# Patient Record
Sex: MALE | Race: WHITE | Employment: FULL TIME | URBAN - METROPOLITAN AREA
[De-identification: names, ages, dates, MRNs, and addresses within clinical notes are randomized per-mention and may not be internally consistent; named-entity substitution may affect disease eponyms.]

---

## 2022-05-05 ENCOUNTER — APPOINTMENT (OUTPATIENT)
Dept: GENERAL RADIOLOGY | Age: 22
End: 2022-05-05
Payer: COMMERCIAL

## 2022-05-05 ENCOUNTER — HOSPITAL ENCOUNTER (EMERGENCY)
Age: 22
Discharge: HOME | End: 2022-05-05
Attending: EMERGENCY MEDICINE
Payer: COMMERCIAL

## 2022-05-05 VITALS
BODY MASS INDEX: 19.72 KG/M2 | TEMPERATURE: 99.1 F | RESPIRATION RATE: 12 BRPM | HEART RATE: 82 BPM | HEIGHT: 72 IN | OXYGEN SATURATION: 100 % | WEIGHT: 145.6 LBS | SYSTOLIC BLOOD PRESSURE: 130 MMHG | DIASTOLIC BLOOD PRESSURE: 73 MMHG

## 2022-05-05 DIAGNOSIS — S69.91XA INJURY OF RIGHT THUMB, INITIAL ENCOUNTER: Primary | ICD-10-CM

## 2022-05-05 PROCEDURE — 6370000000 HC RX 637 (ALT 250 FOR IP): Performed by: EMERGENCY MEDICINE

## 2022-05-05 PROCEDURE — 99283 EMERGENCY DEPT VISIT LOW MDM: CPT

## 2022-05-05 PROCEDURE — 73130 X-RAY EXAM OF HAND: CPT

## 2022-05-05 RX ORDER — LIDOCAINE AND PRILOCAINE 25; 25 MG/G; MG/G
CREAM TOPICAL ONCE
Status: DISCONTINUED | OUTPATIENT
Start: 2022-05-05 | End: 2022-05-05 | Stop reason: HOSPADM

## 2022-05-05 RX ORDER — IBUPROFEN 400 MG/1
400 TABLET ORAL ONCE
Status: COMPLETED | OUTPATIENT
Start: 2022-05-05 | End: 2022-05-05

## 2022-05-05 RX ADMIN — IBUPROFEN 400 MG: 400 TABLET, FILM COATED ORAL at 20:19

## 2022-05-05 ASSESSMENT — PAIN DESCRIPTION - DESCRIPTORS
DESCRIPTORS: NUMBNESS
DESCRIPTORS: ACHING;DULL

## 2022-05-05 ASSESSMENT — PAIN DESCRIPTION - ORIENTATION
ORIENTATION: RIGHT
ORIENTATION: RIGHT

## 2022-05-05 ASSESSMENT — PAIN DESCRIPTION - LOCATION
LOCATION: FINGER (COMMENT WHICH ONE)
LOCATION: FINGER (COMMENT WHICH ONE)

## 2022-05-05 ASSESSMENT — PAIN DESCRIPTION - ONSET: ONSET: SUDDEN

## 2022-05-05 ASSESSMENT — PAIN - FUNCTIONAL ASSESSMENT
PAIN_FUNCTIONAL_ASSESSMENT: ACTIVITIES ARE NOT PREVENTED
PAIN_FUNCTIONAL_ASSESSMENT: 0-10

## 2022-05-05 ASSESSMENT — PAIN DESCRIPTION - PAIN TYPE: TYPE: ACUTE PAIN

## 2022-05-05 ASSESSMENT — PAIN SCALES - GENERAL
PAINLEVEL_OUTOF10: 8
PAINLEVEL_OUTOF10: 8

## 2022-05-05 ASSESSMENT — PAIN DESCRIPTION - FREQUENCY: FREQUENCY: CONTINUOUS

## 2022-05-05 NOTE — ED NOTES
Soaking thumb laceration in Hibiclens and saline. Pt. tolerated well.       Ayse Hendrickson, JAYE  05/05/22 1935

## 2022-05-06 NOTE — ED PROVIDER NOTES
[id number] [address] ENCOUNTER      Pt Name: Lupe Anderson  MRN: [id number]  [place] 2000  Date of evaluation: 5/5/2022  Provider: Jasmina Lindsey MD    CHIEF COMPLAINT       Chief Complaint   Patient presents with    Finger Injury     R thumb crushed at work          HISTORY OF PRESENT ILLNESS   (Location/Symptom, Timing/Onset,Context/Setting, Quality, Duration, Modifying Factors, Severity)  Note limiting factors. Lupe Anderson is a 23 y.o. male who presents to the emergency department for right thumb injury. The patient states that this morning he crushed his finger in a machine at work. This finger had been injured in the past when he crushed in a door resulting in avulsion of the nail. He did sustain this time a laceration to the thumb. He denies any other injuries. He has not taken any medications for pain. Nursing notes were reviewed. REVIEW OF SYSTEMS    (2-9 systems for level 4, 10 or more for level 5)     Review of Systems      PAST MEDICAL HISTORY   History reviewed. No pertinent past medical history. SURGICALHISTORY     History reviewed. No pertinent surgical history. CURRENT MEDICATIONS       Discharge Medication List as of 5/5/2022  8:49 PM      CONTINUE these medications which have NOT CHANGED    Details   ibuprofen (ADVIL;MOTRIN) 600 MG tablet Take 1 tablet by mouth every 8 hours as needed for Pain, Disp-30 tablet, R-0             ALLERGIES     Patient has no known allergies. FAMILY HISTORY     History reviewed. No pertinent family history.        SOCIAL HISTORY       Social History     Socioeconomic History    Marital status: Single     Spouse name: None    Number of children: None    Years of education: None    Highest education level: None   Occupational History    None   Tobacco Use    Smoking status: Current Some Day Smoker     Types: Cigars    Smokeless tobacco: Never Used   Vaping Use    Vaping Use: Every day Substance and Sexual Activity    Alcohol use: Yes     Comment: socially    Drug use: Yes     Types: Marijuana Rodena Capes)    Sexual activity: Yes     Partners: Female   Other Topics Concern    None   Social History Narrative    None     Social Determinants of Health     Financial Resource Strain:     Difficulty of Paying Living Expenses: Not on file   Food Insecurity:     Worried About Running Out of Food in the Last Year: Not on file    Adilson of Food in the Last Year: Not on file   Transportation Needs:     Lack of Transportation (Medical): Not on file    Lack of Transportation (Non-Medical):  Not on file   Physical Activity:     Days of Exercise per Week: Not on file    Minutes of Exercise per Session: Not on file   Stress:     Feeling of Stress : Not on file   Social Connections:     Frequency of Communication with Friends and Family: Not on file    Frequency of Social Gatherings with Friends and Family: Not on file    Attends Anabaptism Services: Not on file    Active Member of [address] [place] [address] or Organizations: Not on file    Attends Club or Organization Meetings: Not on file    Marital Status: Not on file   Intimate Partner Violence:     Fear of Current or Ex-Partner: Not on file    Emotionally Abused: Not on file    Physically Abused: Not on file    Sexually Abused: Not on file   Housing Stability:     Unable to Pay for Housing in the Last Year: Not on file    Number of [place] in the Last Year: Not on file    Unstable Housing in the Last Year: Not on file       SCREENINGS    [place] Coma Scale  Eye Opening: Spontaneous  Best Verbal Response: Oriented  Best Motor Response: Obeys commands  [place] Coma Scale Score: 15        PHYSICAL EXAM    (up to 7 for level 4, 8 or more for level 5)     ED Triage Vitals [05/05/22 1930]   BP Temp Temp Source Pulse Resp SpO2 Height Weight   130/73 99.1 °F (37.3 °C) Oral 82 12 100 % 6' (1.829 m) 145 lb 9.6 oz (66 kg)       Physical Exam  Vitals and nursing note reviewed. Constitutional:       Appearance: Normal appearance. He is well-developed. He is not ill-appearing. HENT:      Head: Normocephalic and atraumatic. Right Ear: External ear normal.      Left Ear: External ear normal.      Nose: Nose normal.   Eyes:      General: No scleral icterus. Right eye: No discharge. Left eye: No discharge. Conjunctiva/sclera: Conjunctivae normal.   Cardiovascular:      Rate and Rhythm: Normal rate. Pulmonary:      Effort: Pulmonary effort is normal. No respiratory distress. Musculoskeletal:      Cervical back: Neck supple. Comments: No obvious deformity of the right thumb he is tender at the distal phalanx. Skin:     Coloration: Skin is not pale. Comments: The anatomy of the right thumb is distorted. He has had a previous nail avulsion. There appears to be a new nail that is growing perhaps midway. There is also a portion of the nail that is on the medial dorsal aspect and the cuticle. He has a new laceration at the tip of the finger but it does not involve the current nail that is growing. It is not gaping well approximated and not actively bleeding. It measures approximately 1 cm horizontally. Neurological:      Mental Status: He is alert.    Psychiatric:         Mood and Affect: Mood normal.         Behavior: Behavior normal.             DIAGNOSTIC RESULTS     EKG: All EKG's are interpreted by the Emergency Department Physician who either signs or Co-signs this chart in the absence of a cardiologist.    12 lead EKG shows     RADIOLOGY:   Non-plain film images such as CT, Ultrasound and MRI are read by the radiologist. Plain radiographic images are visualized and preliminarily interpreted by the emergency physician with the below findings:        Interpretation per the Radiologist below, if available at the time of this note:    XR HAND RIGHT (MIN 3 VIEWS)   Final Result   Normal.            ED BEDSIDE ULTRASOUND:   Performed by ED Physician - none    LABS:  Labs Reviewed - No data to display    All other labs were within normal range or not returned as of this dictation. EMERGENCY DEPARTMENT COURSE and DIFFERENTIAL DIAGNOSIS/MDM:   Vitals:    Vitals:    05/05/22 1930   BP: 130/73   Pulse: 82   Resp: 12   Temp: 99.1 °F (37.3 °C)   TempSrc: Oral   SpO2: 100%   Weight: 145 lb 9.6 oz (66 kg)   Height: 6' (1.829 m)       Adult male with right thumb injury. X-ray ordered to rule out fracture. No fracture identified. Patient given medication for pain. The wound is cleaned with chlorhexedine solution. Based on the location and the way that is injured I am unable to suture this. Bacitracin dressing is applied. Patient encouraged to follow-up with a hand specialist.             CRITICAL CARE TIME   None       CONSULTS:  None    PROCEDURES:       Procedures    FINAL IMPRESSION      1.  Injury of right thumb, initial encounter          DISPOSITION/PLAN   DISPOSITION Decision To Discharge 05/05/2022 08:32:50 PM      PATIENT REFERREDTO:  Oswaldo Gambino MD  [place] [phone]    In 3 days        DISCHARGEMEDICATIONS:  Discharge Medication List as of 5/5/2022  8:49 PM             (Please note that portions of this note were completed with a voice recognition program.  Efforts were made to edit the dictations but occasionally words are mis-transcribed.)    Rafita Ni MD (electronically signed)  Attending Emergency Physician          Rafita Ni MD  05/06/22 [id number]